# Patient Record
(demographics unavailable — no encounter records)

---

## 2024-10-23 NOTE — DATA REVIEWED
[de-identified] : MRI of the lumbar spine performed January 18, 2024.  Impression: Grade 1 anterolisthesis L4 over L5 and evidence of levoscoliosis. L4-L5 anterolisthesis with disc bulge, facet arthrosis and ligamentum flavum hypertrophy.  Resulted in moderate central canal and bilateral lateral recess and mild bilateral neuroforaminal stenosis. L5-S1 central disc protrusion superimposed upon a disc bulge, resulting in mild central canal, bilateral lateral recess and mild bilateral neuroforaminal stenosis with facet arthrosis.

## 2024-10-23 NOTE — HISTORY OF PRESENT ILLNESS
[FreeTextEntry1] : 68-year-old right-handed woman history of hypertension, hyperlipidemia, prediabetes, complaining of chronic back pain, numbness tingling feelings of the feet more on the right than the left, with some foot drop. Evaluations have included MRI of the lower back, showed multilevel degenerative disc disease patient at the L5-S1 area. Has been going to physical therapy, did go to pain management.  With only partial relief.

## 2024-10-23 NOTE — DISCUSSION/SUMMARY
[FreeTextEntry1] : 68-year-old woman with a history of hypertension, hyperlipidemia, prediabetic, complaining of chronic back pain.  Numbness, weakness of the feet.  More on the right than the left. Electrodiagnostic study consistent with underlying axonal neuropathy as well as chronic lumbosacral radiculopathy. Reviewed and discussed study findings. Recommended follow-up with her pain management, primary care. Discussed the importance of glucose control, weight loss. Pain management techniques for her chronic back pain. Return in 4 to 6 months.

## 2024-10-23 NOTE — REVIEW OF SYSTEMS
[As Noted in HPI] : as noted in HPI [Leg Weakness] : leg weakness [Numbness] : numbness [Tingling] : tingling [Difficulty Walking] : difficulty walking [Negative] : Heme/Lymph [FreeTextEntry9] : Chronic back pain

## 2024-10-23 NOTE — PROCEDURE
[FreeTextEntry1] : Electromyography and nerve conduction of the bilateral lower extremity, right upper extremity demonstrates evidence of a chronic bilateral L5-S1 radiculopathy, as well as the presence of an underlying sensorimotor axonal neuropathy affecting the lower extremities more than the uppers.

## 2024-10-23 NOTE — DATA REVIEWED
[de-identified] : MRI of the lumbar spine performed January 18, 2024.  Impression: Grade 1 anterolisthesis L4 over L5 and evidence of levoscoliosis. L4-L5 anterolisthesis with disc bulge, facet arthrosis and ligamentum flavum hypertrophy.  Resulted in moderate central canal and bilateral lateral recess and mild bilateral neuroforaminal stenosis. L5-S1 central disc protrusion superimposed upon a disc bulge, resulting in mild central canal, bilateral lateral recess and mild bilateral neuroforaminal stenosis with facet arthrosis.

## 2024-10-23 NOTE — PHYSICAL EXAM
[General Appearance - Alert] : alert [General Appearance - In No Acute Distress] : in no acute distress [Person] : oriented to person [Place] : oriented to place [Time] : oriented to time [Cranial Nerves Optic (II)] : visual acuity intact bilaterally,  visual fields full to confrontation, pupils equal round and reactive to light [Cranial Nerves Oculomotor (III)] : extraocular motion intact [Cranial Nerves Trigeminal (V)] : facial sensation intact symmetrically [Cranial Nerves Facial (VII)] : face symmetrical [Cranial Nerves Vestibulocochlear (VIII)] : hearing was intact bilaterally [Cranial Nerves Glossopharyngeal (IX)] : tongue and palate midline [Cranial Nerves Accessory (XI - Cranial And Spinal)] : head turning and shoulder shrug symmetric [Cranial Nerves Hypoglossal (XII)] : there was no tongue deviation with protrusion [Motor Strength] : muscle strength was normal in all four extremities [Motor Handedness Right-Handed] : the patient is right hand dominant [Paresis Pronator Drift Right-Sided] : no pronator drift on the right [Paresis Pronator Drift Left-Sided] : no pronator drift on the left [Tactile Decrease Lower Medial Thigh And Knee - Right Only] : diminished right lower medial thigh and knee [Tactile Decrease Lower Medial Thigh And Knee - Left Only] : normal left lower medial thigh and knee [Tactile Decrease Deep Peroneal Nerve Right Leg] : diminished right web space between the 1st and 2nd toes [Tactile Decrease Deep Peroneal Nerve Left Leg] : normal left web space between the 1st and 2nd toes [Tactile Decrease Superficial Peroneal Nerve Right Leg] : diminished right dorsum of the foot [Tactile Decrease Superficial Peroneal Nerve Left Leg] : normal left dorsum of the foot [Dysdiadochokinesia Bilaterally] : not present [Coordination - Dysmetria Impaired Finger-to-Nose Bilateral] : not present [1+] : Patella left 1+ [0] : Ankle jerk left 0 [Pitting Edema] : pitting edema present [___ +] : bilateral [unfilled]+ pitting edema to the ankles

## 2024-10-30 NOTE — CARDIOLOGY SUMMARY
[de-identified] : 10/30/24  sinus rhythm nonspecific T changes  [de-identified] : 8/8/23  Mild ASTRID  LVH hyperdynamic LVEF mild LVOT obstruction, Mild TR  [de-identified] : 11/22/2019  mild diease

## 2024-10-30 NOTE — HISTORY OF PRESENT ILLNESS
[FreeTextEntry1] : patient  with  hx hypertension , hypertensive heart disease , hyperlipidemia , obesity , sleep apnea, peripheral neuropathy  L5 S1 neuropathy /s tu stenosis  who came for  follow up after prolonged gap , patient is feeling ok , has chronic exertional shortness of breath , without chest pain , patient walking with cane ,  patient denies any dizziness ,  patient tolerating the medications well , weight has been stable     patient did have echo showed hypertensive heart disease mild ASTRID hyperdynamic LVEF ,   no evidence of DVT on doppler of LE , patient  has sedentary life style . does not use cpap   echo showed asymmetric septal hypertrophy  hyperdynamic EF mild outflow tract obstruction ,  (patient says she developed low Extremity swelling on amlodipine , currently taking nifedipine  left is slightly more than right , getting better after  holding medication .she has on and off for many years  patient had recent venous doppler )  patient blood work  10/1/24    Tg 91 HDL 48, Hb a1c 5.9,

## 2024-10-30 NOTE — REASON FOR VISIT
[Symptom and Test Evaluation] : symptom and test evaluation [Structural Heart and Valve Disease] : structural heart and valve disease [Hyperlipidemia] : hyperlipidemia [Hypertension] : hypertension [Other: ____] : [unfilled]

## 2024-10-30 NOTE — PHYSICAL EXAM
[Obese] : obese [Normal Conjunctiva] : normal conjunctiva [Normal Venous Pressure] : normal venous pressure [No Carotid Bruit] : no carotid bruit [Normal Rate] : normal [Normal S1] : normal S1 [Normal S2] : normal S2 [S3] : no S3 [S4] : no S4 [No Murmur] : no murmurs heard [___ +] : bilateral [unfilled]U+ pitting edema to the ankles [Rt] : no varicose veins of the right leg [Lt] : no varicose veins of the left leg [Right Carotid Bruit] : no bruit heard over the right carotid [Left Carotid Bruit] : no bruit heard over the left carotid [Right Femoral Bruit] : no bruit heard over the right femoral artery [Left Femoral Bruit] : no bruit heard over the left femoral artery [2+] : left 2+ [No Abnormalities] : the abdominal aorta was not enlarged and no bruit was heard [Clear Lung Fields] : clear lung fields [Good Air Entry] : good air entry [No Respiratory Distress] : no respiratory distress  [Soft] : abdomen soft [Non Tender] : non-tender [No Masses/organomegaly] : no masses/organomegaly [Normal Bowel Sounds] : normal bowel sounds [Normal Gait] : normal gait [No Cyanosis] : no cyanosis [No Clubbing] : no clubbing [No Varicosities] : no varicosities [Edema ___] : edema [unfilled] [No Rash] : no rash [No Skin Lesions] : no skin lesions [Moves all extremities] : moves all extremities [No Focal Deficits] : no focal deficits [Normal Speech] : normal speech [Alert and Oriented] : alert and oriented [Normal memory] : normal memory

## 2024-10-30 NOTE — ASSESSMENT
[FreeTextEntry1] : Patient with above hx  Chronic Bilateral  LE edema right more than left : dependent , venous insufficiency  will continue  spironolactone 25 mg po daily ,    controlled hypertension :  , would  continue  [id number] mg  po daily will continue   coreg 25 mg po BID ,  will spironolactone 25 mg po daily ,  electrolytes normal   Asymmetric septal hypertrophy   possibly due to hypertensive heart disease ( was not present in prior echo )  strict bp control   will obtain follow up echo to monitor the ASTRID   hyperlipidemia: resumed taking medication, continue low cholesterol diet medication check LFTS , lipid profile    follow up after

## 2025-03-02 NOTE — REASON FOR VISIT
[Symptom and Test Evaluation] : symptom and test evaluation [Structural Heart and Valve Disease] : structural heart and valve disease [Hypertension] : hypertension [Hyperlipidemia] : hyperlipidemia [Other: ____] : [unfilled]

## 2025-03-03 NOTE — HISTORY OF PRESENT ILLNESS
[FreeTextEntry1] : patient  with  hx hypertension , hypertensive heart disease , hyperlipidemia , obesity , sleep apnea, peripheral neuropathy  L5 S1 neuropathy /spinal stenosis  who came for  follow up  patient is feeling ok , has chronic exertional shortness of breath , without chest pain , patient walking with cane ,  patient denies any dizziness ,  patient tolerating the medications well , weight has been stable     patient did have echo showed hypertensive heart disease mild ASTRID hyperdynamic LVEF ,   no evidence of DVT on doppler of LE , patient  has sedentary life style . does not use cpap   echo showed asymmetric septal hypertrophy  hyperdynamic EF mild outflow tract obstruction ,  (patient says she developed low Extremity swelling on amlodipine , currently taking nifedipine  left is slightly more than right , getting better after  holding medication .she has on and off for many years  patient had recent venous doppler )  patient blood work  10/1/24    Tg 91 HDL 48, Hb a1c 5.9,  patient is not adherent to medication     This visit was conducted as part of ongoing, longitudinal medical care for patient's chronic medical diagnoses and other issues.

## 2025-03-03 NOTE — PHYSICAL EXAM
[Obese] : obese [Normal Conjunctiva] : normal conjunctiva [Normal Venous Pressure] : normal venous pressure [No Carotid Bruit] : no carotid bruit [Normal Rate] : normal [Normal S1] : normal S1 [Normal S2] : normal S2 [No Murmur] : no murmurs heard [___ +] : bilateral [unfilled]U+ pitting edema to the ankles [2+] : left 2+ [No Abnormalities] : the abdominal aorta was not enlarged and no bruit was heard [Clear Lung Fields] : clear lung fields [Good Air Entry] : good air entry [No Respiratory Distress] : no respiratory distress  [Soft] : abdomen soft [Non Tender] : non-tender [Normal Bowel Sounds] : normal bowel sounds [No Masses/organomegaly] : no masses/organomegaly [Normal Gait] : normal gait [No Cyanosis] : no cyanosis [No Clubbing] : no clubbing [No Varicosities] : no varicosities [Edema ___] : edema [unfilled] [No Rash] : no rash [No Skin Lesions] : no skin lesions [Moves all extremities] : moves all extremities [No Focal Deficits] : no focal deficits [Normal Speech] : normal speech [Alert and Oriented] : alert and oriented [Normal memory] : normal memory [S3] : no S3 [S4] : no S4 [Rt] : no varicose veins of the right leg [Lt] : no varicose veins of the left leg [Right Carotid Bruit] : no bruit heard over the right carotid [Left Carotid Bruit] : no bruit heard over the left carotid [Right Femoral Bruit] : no bruit heard over the right femoral artery [Left Femoral Bruit] : no bruit heard over the left femoral artery

## 2025-03-03 NOTE — ASSESSMENT
[FreeTextEntry1] : Patient with above hx  Chronic Bilateral  LE edema right more than left : dependent , venous insufficiency  will continue  spironolactone 25 mg po daily ,   use pressure stocking   controlled hypertension :   hypertensive heart disease would  continue  [id number] mg  po daily will continue   coreg 25 mg po BID ,  will spironolactone 25 mg po daily ,  electrolytes normal   Asymmetric septal hypertrophy   possibly due to hypertensive heart disease ( was not present in prior echo )  strict bp control  no significant change from prior echo   hyperlipidemia: resumed taking medication, suboptimal , not adherent to medication , continue low cholesterol diet medication check LFTS , lipid profile    follow up after 4 months

## 2025-03-03 NOTE — ASSESSMENT
[FreeTextEntry1] : Patient with above hx  Chronic Bilateral  LE edema right more than left : dependent , venous insufficiency  will continue  spironolactone 25 mg po daily ,   use pressure stocking   controlled hypertension :   hypertensive heart disease would  continue  [id number] mg  po daily will continue   coreg 25 mg po BID ,  will spironolactone 25 mg po daily ,  electrolytes normal   Asymmetric septal hypertrophy   possibly due to hypertensive heart disease ( was not present in prior echo )  strict bp control  no significant change from prior echo   hyperlipidemia: resumed taking medication, suboptimal , not adherent to medication , continue low cholesterol diet medication check LFTS , lipid profile    follow up after 4 months

## 2025-03-03 NOTE — CARDIOLOGY SUMMARY
[de-identified] : 3/3/25  sinus rhythm nonspecific T changes  [de-identified] : 11/13/2024  Mild ASTRID  LVH hyperdynamic LVEF mild LVOT obstruction, Mild to moderate  TR  mild MR mild ascending aorta 3. cm  [de-identified] : 11/22/2019  mild diease

## 2025-03-03 NOTE — CARDIOLOGY SUMMARY
[de-identified] : 3/3/25  sinus rhythm nonspecific T changes  [de-identified] : 11/13/2024  Mild ASTRID  LVH hyperdynamic LVEF mild LVOT obstruction, Mild to moderate  TR  mild MR mild ascending aorta 3. cm  [de-identified] : 11/22/2019  mild diease

## 2025-03-03 NOTE — PHYSICAL EXAM
[Obese] : obese [Normal Conjunctiva] : normal conjunctiva [Normal Venous Pressure] : normal venous pressure [No Carotid Bruit] : no carotid bruit [Normal Rate] : normal [Normal S1] : normal S1 [Normal S2] : normal S2 [No Murmur] : no murmurs heard [___ +] : bilateral [unfilled]U+ pitting edema to the ankles [2+] : left 2+ [No Abnormalities] : the abdominal aorta was not enlarged and no bruit was heard [Clear Lung Fields] : clear lung fields [Good Air Entry] : good air entry [No Respiratory Distress] : no respiratory distress  [Soft] : abdomen soft [Non Tender] : non-tender [Normal Bowel Sounds] : normal bowel sounds [No Masses/organomegaly] : no masses/organomegaly [Normal Gait] : normal gait [No Cyanosis] : no cyanosis [No Clubbing] : no clubbing [No Varicosities] : no varicosities [Edema ___] : edema [unfilled] [No Rash] : no rash [No Skin Lesions] : no skin lesions [Moves all extremities] : moves all extremities [No Focal Deficits] : no focal deficits [Alert and Oriented] : alert and oriented [Normal Speech] : normal speech [Normal memory] : normal memory [S3] : no S3 [S4] : no S4 [Rt] : no varicose veins of the right leg [Lt] : no varicose veins of the left leg [Right Carotid Bruit] : no bruit heard over the right carotid [Left Carotid Bruit] : no bruit heard over the left carotid [Right Femoral Bruit] : no bruit heard over the right femoral artery [Left Femoral Bruit] : no bruit heard over the left femoral artery

## 2025-06-03 NOTE — HISTORY OF PRESENT ILLNESS
[de-identified] : The patient comes in today with complaints of pain to her bilateral knees.  She states it has been going on for many years.  She had not had any real treatment.  She is dealing with swelling in her legs.  She had an ultrasound that showed no evidence of DVT.  The patient describes the pain as sharp and throbbing.  [8] : a current pain level of 8/10 [de-identified] : Walking, bending and lying down [de-identified] : Rest and medication

## 2025-06-03 NOTE — PHYSICAL EXAM
[de-identified] :  Right Knee: Range of Motion in Degrees                                  Claimant:        Normal: Flexion Active          90                   135-degrees Flexion Passive       90                   135-degrees Extension Active        0                   0-5-degrees Extension Passive     0                   0-5-degrees   Range of motion limited by body habitus.  No weakness to flexion/extension.  No evidence of instability in the AP plane on varus or valgus stress.  Negative Lachman.  Negative pivot shift.  Negative anterior drawer test.  Negative posterior drawer test.  Negative Santiago.  Negative Apley grind.  No medial or lateral joint line tenderness.  Positive tenderness over the medial and lateral facet of the patella.  Positive patellofemoral crepitations.  No lateral tilting patella.  No patella apprehension.  Positive crepitation in the medial and lateral femoral condyle.  Diffuse swelling distally (left worse than right) with some evidence of venous stasis.  No gross motor or sensory deficits.  2+ DP and PT pulses.  Moderate varus deformity.  Skin is intact.  No rashes, scars or lesions.  Left Knee: Range of Motion in Degrees                                  Claimant:        Normal: Flexion Active          90                   135-degrees Flexion Passive       90                   135-degrees Extension Active        0                   0-5-degrees Extension Passive     0                   0-5-degrees   Range of motion limited by body habitus.  No weakness to flexion/extension.  No evidence of instability in the AP plane on varus or valgus stress.  Negative Lachman.  Negative pivot shift.  Negative anterior drawer test.  Negative posterior drawer test.  Negative Santiago.  Negative Apley grind.  No medial or lateral joint line tenderness.  Positive tenderness over the medial and lateral facet of the patella.  Positive patellofemoral crepitations.  No lateral tilting patella.  No patella apprehension.  Positive crepitation in the medial and lateral femoral condyle.  Diffuse swelling distally (left worse than right) with some evidence of venous stasis.  No gross motor or sensory deficits.  2+ DP and PT pulses.  Moderate varus deformity.  Skin is intact.  No rashes, scars or lesions.   [de-identified] : Gait and Station:  Ambulating with a slightly antalgic to antalgic gait.  Normal Station.  [de-identified] : Appearance:  Well-developed, well-nourished female in no acute distress.   [de-identified] :   Radiographs, one to two views of the right knee taken in the office today, show severe arthritis, bone-on-bone in the medial compartment.   Radiographs, one to two views of the left knee taken in the office today, show severe arthritis, bone-on-bone in the medial compartment. Radiograph, one view AP standing of bilateral knees taken in the office today, shows severe arthritis, bone-on-bone in the medial compartment.

## 2025-06-03 NOTE — REVIEW OF SYSTEMS
[Joint Pain] : joint pain [Joint Stiffness] : joint stiffness [Joint Swelling] : joint swelling [Recent Weight Gain (___ Lbs)] : recent ~M [unfilled] lbs weight gain [Redness] : red eyes [Lower Ext Edema] : lower extremity edema [Wheezing] : wheezing [Constipation] : constipation [Incontinence] : incontinence [Negative] : Heme/Lymph

## 2025-06-03 NOTE — CONSULT LETTER
[Dear  ___] : Dear  [unfilled], [Consult Letter:] : I had the pleasure of evaluating your patient, [unfilled]. [Please see my note below.] : Please see my note below. [Consult Closing:] : Thank you very much for allowing me to participate in the care of this patient.  If you have any questions, please do not hesitate to contact me. [Sincerely,] : Sincerely, [FreeTextEntry3] : Lasha Stevens, III, MD [facility]

## 2025-06-03 NOTE — PHYSICAL EXAM
[de-identified] :  Right Knee: Range of Motion in Degrees                                  Claimant:        Normal: Flexion Active          90                   135-degrees Flexion Passive       90                   135-degrees Extension Active        0                   0-5-degrees Extension Passive     0                   0-5-degrees   Range of motion limited by body habitus.  No weakness to flexion/extension.  No evidence of instability in the AP plane on varus or valgus stress.  Negative Lachman.  Negative pivot shift.  Negative anterior drawer test.  Negative posterior drawer test.  Negative Santiago.  Negative Apley grind.  No medial or lateral joint line tenderness.  Positive tenderness over the medial and lateral facet of the patella.  Positive patellofemoral crepitations.  No lateral tilting patella.  No patella apprehension.  Positive crepitation in the medial and lateral femoral condyle.  Diffuse swelling distally (left worse than right) with some evidence of venous stasis.  No gross motor or sensory deficits.  2+ DP and PT pulses.  Moderate varus deformity.  Skin is intact.  No rashes, scars or lesions.  Left Knee: Range of Motion in Degrees                                  Claimant:        Normal: Flexion Active          90                   135-degrees Flexion Passive       90                   135-degrees Extension Active        0                   0-5-degrees Extension Passive     0                   0-5-degrees   Range of motion limited by body habitus.  No weakness to flexion/extension.  No evidence of instability in the AP plane on varus or valgus stress.  Negative Lachman.  Negative pivot shift.  Negative anterior drawer test.  Negative posterior drawer test.  Negative Santiago.  Negative Apley grind.  No medial or lateral joint line tenderness.  Positive tenderness over the medial and lateral facet of the patella.  Positive patellofemoral crepitations.  No lateral tilting patella.  No patella apprehension.  Positive crepitation in the medial and lateral femoral condyle.  Diffuse swelling distally (left worse than right) with some evidence of venous stasis.  No gross motor or sensory deficits.  2+ DP and PT pulses.  Moderate varus deformity.  Skin is intact.  No rashes, scars or lesions.   [de-identified] : Gait and Station:  Ambulating with a slightly antalgic to antalgic gait.  Normal Station.  [de-identified] : Appearance:  Well-developed, well-nourished female in no acute distress.   [de-identified] :   Radiographs, one to two views of the right knee taken in the office today, show severe arthritis, bone-on-bone in the medial compartment.   Radiographs, one to two views of the left knee taken in the office today, show severe arthritis, bone-on-bone in the medial compartment. Radiograph, one view AP standing of bilateral knees taken in the office today, shows severe arthritis, bone-on-bone in the medial compartment.

## 2025-06-03 NOTE — DISCUSSION/SUMMARY
[de-identified] : At this time, due to osteoarthritis of bilateral knees, I recommended a course of physical therapy, topical anti-inflammatory, and reassessment in 4 weeks.  We will discuss the potential for intervention, including injections.  Of note, she is being referred for a vascular evaluation.

## 2025-06-03 NOTE — HISTORY OF PRESENT ILLNESS
[de-identified] : The patient comes in today with complaints of pain to her bilateral knees.  She states it has been going on for many years.  She had not had any real treatment.  She is dealing with swelling in her legs.  She had an ultrasound that showed no evidence of DVT.  The patient describes the pain as sharp and throbbing.  [8] : a current pain level of 8/10 [de-identified] : Walking, bending and lying down [de-identified] : Rest and medication

## 2025-06-03 NOTE — DISCUSSION/SUMMARY
[de-identified] : At this time, due to osteoarthritis of bilateral knees, I recommended a course of physical therapy, topical anti-inflammatory, and reassessment in 4 weeks.  We will discuss the potential for intervention, including injections.  Of note, she is being referred for a vascular evaluation.

## 2025-06-03 NOTE — ADDENDUM
[FreeTextEntry1] : This note was written by Zhang Short on 06/03/2025, acting as a scribe for Lasha Stevens III, MD

## 2025-06-12 NOTE — HISTORY OF PRESENT ILLNESS
[FreeTextEntry1] : 69 year female with past medical history significant for hypertension and hyperlipidemia presenting for consultation for longstanding bilateral lower extremity lymphedema. Patient denies any weeping or any new or non-healing wounds. She has tried to use compression stockings but has difficulty applying them. She elevates her legs at night.

## 2025-06-12 NOTE — PHYSICAL EXAM
[Ankle Swelling (On Exam)] : present [Ankle Swelling Bilaterally] : severe [No Rash or Lesion] : No rash or lesion [Alert] : alert [Oriented to Person] : oriented to person [Oriented to Place] : oriented to place [Oriented to Time] : oriented to time [2+] : left 2+ [0] : left 0 [Varicose Veins Of Lower Extremities] : not present [de-identified] : ambulating with cane [FreeTextEntry1] : B/L LE warm, well perfused no wounds Moderate edema bilaterally L > R, no weeping Hyperkeratosis left distal shin

## 2025-06-12 NOTE — ASSESSMENT
[FreeTextEntry1] : Patient presents with lymphedema secondary to CVI. Despite efforts of compression, elevation and exercise for over 4 weeks symptoms persist with mild hyperpigmentation L > R. On examination no weeping no ulcerations.  -will refer to [employer] for pneumatic pumps  -encouraged skin moisturization and leg elevation

## 2025-07-14 NOTE — ASSESSMENT
[FreeTextEntry1] : Patient with above hx  Chronic Bilateral  LE edema right more than left : dependent , venous insufficiency  will continue  spironolactone 25 mg po daily ,   use pressure stocking   mild uncontrolled hypertension :   hypertensive heart disease would  continue  [id number] mg  po daily will continue   coreg 25 mg po BID ,  will  continue spironolactone 25 mg po daily ,  electrolytes normal , reluctant to change medication , advised low salt diet , home BP monitor   Asymmetric septal hypertrophy   possibly due to hypertensive heart disease ( was not present in prior echo )  strict bp control  no significant change from prior echo   hyperlipidemia: resumed taking medication, suboptimal , not adherent to medication , continue low cholesterol diet medication check LFTS , lipid profile   Obesity   sleep apnea  does not use cpap    follow up after 4 months

## 2025-07-14 NOTE — CARDIOLOGY SUMMARY
[de-identified] : 7/14/25   sinus rhythm nonspecific T changes  [de-identified] : 11/13/2024  Mild ASTRID  LVH hyperdynamic LVEF mild LVOT obstruction, Mild to moderate  TR  mild MR mild ascending aorta 3. cm  [de-identified] : 11/22/2019  mild diease

## 2025-07-14 NOTE — HISTORY OF PRESENT ILLNESS
[FreeTextEntry1] : patient  with  hx hypertension , hypertensive heart disease , hyperlipidemia , Morbid obesity , sleep apnea, peripheral neuropathy  L5 S1 neuropathy /spinal stenosis  who came for  follow up  patient is feeling ok , patient had chronic LE edema which is not getting better ,  patient  has chronic exertional shortness of breath , without chest pain , patient walking with cane ,  patient denies any dizziness ,  patient tolerating the medications well , Her weight has gone up , patient blood pressure is elevated patient has chronic pain both LE    patient did have echo showed hypertensive heart disease mild ASTRID hyperdynamic LVEF ,   no evidence of DVT on doppler of LE , patient  has sedentary life style . does not use cpap   echo showed asymmetric septal hypertrophy  hyperdynamic EF mild outflow tract obstruction ,  (patient says she developed low Extremity swelling on amlodipine , currently taking nifedipine  left is slightly more than right , getting better after  holding medication .she has on and off for many years  patient had recent venous doppler )  patient blood work  April 2025    Tg 91 HDL 48, Hb a1c6.1,  patient is not adherent to medication     This visit was conducted as part of ongoing, longitudinal medical care for patient's chronic medical diagnoses and other issues.

## 2025-07-14 NOTE — ASSESSMENT
[FreeTextEntry1] : Patient with above hx  Chronic Bilateral  LE edema right more than left : dependent , venous insufficiency  will continue  spironolactone 25 mg po daily ,   use pressure stocking   mild uncontrolled hypertension :   hypertensive heart disease would  continue  [id number] mg  po daily will continue   coreg 25 mg po BID ,  will  continue spironolactone 25 mg po daily ,  electrolytes normal , reluctant to change medication , advised low salt diet , home BP monitor   Asymmetric septal hypertrophy   possibly due to hypertensive heart disease ( was not present in prior echo )  strict bp control  no significant change from prior echo   hyperlipidemia: resumed taking medication, suboptimal , not adherent to medication , continue low cholesterol diet medication check LFTS , lipid profile   Obesity   sleep apnea  does not use cpap    follow up after 4 months

## 2025-07-14 NOTE — PHYSICAL EXAM
[Obese] : obese [Normal Conjunctiva] : normal conjunctiva [Normal Venous Pressure] : normal venous pressure [No Carotid Bruit] : no carotid bruit [Normal Rate] : normal [Normal S1] : normal S1 [Normal S2] : normal S2 [No Murmur] : no murmurs heard [___ +] : bilateral [unfilled]U+ pitting edema to the ankles [2+] : left 2+ [No Abnormalities] : the abdominal aorta was not enlarged and no bruit was heard [Clear Lung Fields] : clear lung fields [Good Air Entry] : good air entry [No Respiratory Distress] : no respiratory distress  [Soft] : abdomen soft [Non Tender] : non-tender [No Masses/organomegaly] : no masses/organomegaly [Normal Bowel Sounds] : normal bowel sounds [Normal Gait] : normal gait [No Cyanosis] : no cyanosis [No Clubbing] : no clubbing [No Varicosities] : no varicosities [Edema ___] : edema [unfilled] [No Rash] : no rash [No Skin Lesions] : no skin lesions [Moves all extremities] : moves all extremities [No Focal Deficits] : no focal deficits [Normal Speech] : normal speech [Alert and Oriented] : alert and oriented [Normal memory] : normal memory [S3] : no S3 [S4] : no S4 [Rt] : no varicose veins of the right leg [Lt] : no varicose veins of the left leg [Right Carotid Bruit] : no bruit heard over the right carotid [Left Carotid Bruit] : no bruit heard over the left carotid [Right Femoral Bruit] : no bruit heard over the right femoral artery [Left Femoral Bruit] : no bruit heard over the left femoral artery